# Patient Record
Sex: FEMALE | Race: WHITE | NOT HISPANIC OR LATINO | Employment: OTHER | URBAN - METROPOLITAN AREA
[De-identification: names, ages, dates, MRNs, and addresses within clinical notes are randomized per-mention and may not be internally consistent; named-entity substitution may affect disease eponyms.]

---

## 2024-08-14 ENCOUNTER — HOSPITAL ENCOUNTER (EMERGENCY)
Facility: HOSPITAL | Age: 54
Discharge: HOME | End: 2024-08-14
Attending: EMERGENCY MEDICINE
Payer: COMMERCIAL

## 2024-08-14 ENCOUNTER — APPOINTMENT (OUTPATIENT)
Dept: CT IMAGING | Facility: HOSPITAL | Age: 54
End: 2024-08-14
Payer: COMMERCIAL

## 2024-08-14 VITALS
DIASTOLIC BLOOD PRESSURE: 67 MMHG | OXYGEN SATURATION: 96 % | HEART RATE: 72 BPM | TEMPERATURE: 98.2 F | HEIGHT: 66 IN | RESPIRATION RATE: 18 BRPM | BODY MASS INDEX: 47.09 KG/M2 | WEIGHT: 293 LBS | SYSTOLIC BLOOD PRESSURE: 140 MMHG

## 2024-08-14 DIAGNOSIS — S06.0X0A CONCUSSION WITHOUT LOSS OF CONSCIOUSNESS, INITIAL ENCOUNTER: ICD-10-CM

## 2024-08-14 DIAGNOSIS — S09.90XA INJURY OF HEAD, INITIAL ENCOUNTER: Primary | ICD-10-CM

## 2024-08-14 PROCEDURE — 99282 EMERGENCY DEPT VISIT SF MDM: CPT | Performed by: EMERGENCY MEDICINE

## 2024-08-14 PROCEDURE — 99284 EMERGENCY DEPT VISIT MOD MDM: CPT

## 2024-08-14 PROCEDURE — 70450 CT HEAD/BRAIN W/O DYE: CPT

## 2024-08-14 NOTE — Clinical Note
[facility] [place] [id number] [place] [address] [place] [address]  Phone: [phone]    Stefany Gallo was seen and treated in our emergency department on 8/14/2024.  She may return to work on 08/16/2024.         Thank you for choosing [facility].    Patrick Mitchell MD

## 2024-08-15 NOTE — DISCHARGE INSTRUCTIONS
Rest and no strenuous activity until feeling back to normal self.  Please refrain from any activities which can result in another head injury until feeling back to normal self.  Take Tylenol and ibuprofen as needed for pain.  Follow-up with your provider.  Seek immediate medical attention if having any concerns.

## 2024-08-15 NOTE — ED NOTES
"Bent over to pick something up and hit head on shelf , dizziness, nausea. Pt states it \"went dark\" for a second  "

## 2024-08-15 NOTE — FSED PROVIDER NOTE
Subjective   History of Present Illness  Patient is a 54-year-old woman who presents complaining of headache and dizziness and feeling nauseous after hitting her head approximately 1 to 2 hours prior to arrival.  Patient states that she was at home and was bending over and forgot that the wooden shelf was open and she hit her head twice on the wooden shelf.  She denies any loss of consciousness however felt dazed and is now having dizziness and feeling off.  Positive nausea but no vomiting.  No amnesia.  No other injuries.    Review of Systems    Past Medical History:   Diagnosis Date    Asthma        Allergies   Allergen Reactions    Cephalosporins Shortness Of Breath    Codeine Shortness Of Breath    Penicillins Shortness Of Breath    Sulfa Antibiotics Shortness Of Breath       History reviewed. No pertinent surgical history.    History reviewed. No pertinent family history.    Social History     Socioeconomic History    Marital status: Single           Objective   Physical Exam  Vitals and nursing note reviewed.   Constitutional:       General: She is in acute distress.      Appearance: Normal appearance. She is not ill-appearing or toxic-appearing.   HENT:      Head: Normocephalic.      Comments: Tenderness to the upper mid forehead.  No hematoma.  Superficial abrasion noted.  No active bleeding.  No Barun sign or raccoon eyes.     Nose: Nose normal.      Mouth/Throat:      Mouth: Mucous membranes are moist.   Eyes:      Extraocular Movements: Extraocular movements intact.      Pupils: Pupils are equal, round, and reactive to light.   Cardiovascular:      Rate and Rhythm: Normal rate and regular rhythm.      Pulses: Normal pulses.      Heart sounds: Normal heart sounds.   Pulmonary:      Effort: Pulmonary effort is normal.      Breath sounds: Normal breath sounds.   Abdominal:      Tenderness: There is no abdominal tenderness.   Musculoskeletal:         General: No tenderness or signs of injury. Normal range of  motion.      Cervical back: Normal range of motion and neck supple. No tenderness.   Skin:     General: Skin is warm and dry.      Capillary Refill: Capillary refill takes less than 2 seconds.   Neurological:      General: No focal deficit present.      Mental Status: She is alert.      Cranial Nerves: No cranial nerve deficit.         Procedures           ED Course                                           Medical Decision Making  Problems Addressed:  Concussion without loss of consciousness, initial encounter: complicated acute illness or injury  Injury of head, initial encounter: complicated acute illness or injury    Amount and/or Complexity of Data Reviewed  Radiology: ordered.    Patient with injury to the upper mid forehead approximately 2 hours prior to arrival when she hit her head twice on a wooden shelf.  She denies any loss of consciousness or vomiting but has been having headaches and feeling dizzy and having nausea.  Patient is not on any blood thinners.  Patient has a GCS of 15 and nonfocal neurological examination but does have tenderness to the mid forehead with superficial abrasion but no hematoma.  Patient will undergo CT head without contrast.  CT of the head shows no acute intracranial findings.    Suspect mild concussion.  Patient advised to rest and no strenuous activity and not to perform activities which could result head injury until feeling back to normal self.  Patient voiced understanding this plan.    Final diagnoses:   Injury of head, initial encounter   Concussion without loss of consciousness, initial encounter       ED Disposition  ED Disposition       ED Disposition   Discharge    Condition   Stable    Comment   --               Provider, No Known  [facility]  [place] [id number]    In 3 days      [place] [address] [address] [place] [id number] [phone]    If symptoms worsen         Medication List      No changes were  made to your prescriptions during this visit.